# Patient Record
Sex: FEMALE | Race: WHITE | NOT HISPANIC OR LATINO | URBAN - METROPOLITAN AREA
[De-identification: names, ages, dates, MRNs, and addresses within clinical notes are randomized per-mention and may not be internally consistent; named-entity substitution may affect disease eponyms.]

---

## 2019-08-27 ENCOUNTER — OUTPATIENT (OUTPATIENT)
Dept: OUTPATIENT SERVICES | Facility: HOSPITAL | Age: 64
LOS: 1 days | End: 2019-08-27

## 2019-09-09 PROBLEM — Z00.00 ENCOUNTER FOR PREVENTIVE HEALTH EXAMINATION: Status: ACTIVE | Noted: 2019-09-09

## 2019-09-12 ENCOUNTER — APPOINTMENT (OUTPATIENT)
Dept: ORTHOPEDIC SURGERY | Facility: CLINIC | Age: 64
End: 2019-09-12
Payer: MEDICARE

## 2019-09-12 VITALS
WEIGHT: 116 LBS | HEART RATE: 50 BPM | SYSTOLIC BLOOD PRESSURE: 108 MMHG | DIASTOLIC BLOOD PRESSURE: 61 MMHG | BODY MASS INDEX: 21.35 KG/M2 | HEIGHT: 62 IN

## 2019-09-12 DIAGNOSIS — Z80.9 FAMILY HISTORY OF MALIGNANT NEOPLASM, UNSPECIFIED: ICD-10-CM

## 2019-09-12 DIAGNOSIS — Z78.9 OTHER SPECIFIED HEALTH STATUS: ICD-10-CM

## 2019-09-12 PROCEDURE — 73130 X-RAY EXAM OF HAND: CPT | Mod: 26,LT

## 2019-09-12 PROCEDURE — 73110 X-RAY EXAM OF WRIST: CPT | Mod: 26,LT

## 2019-09-12 PROCEDURE — 20550 NJX 1 TENDON SHEATH/LIGAMENT: CPT | Mod: LT

## 2019-09-12 PROCEDURE — 99203 OFFICE O/P NEW LOW 30 MIN: CPT | Mod: 25

## 2019-09-12 RX ORDER — ESCITALOPRAM OXALATE 10 MG/1
10 TABLET, FILM COATED ORAL
Refills: 0 | Status: ACTIVE | COMMUNITY

## 2019-09-12 NOTE — CONSULT LETTER
[Dear  ___] : Dear  [unfilled], [Consult Letter:] : I had the pleasure of evaluating your patient, [unfilled]. [Please see my note below.] : Please see my note below. [Consult Closing:] : Thank you very much for allowing me to participate in the care of this patient.  If you have any questions, please do not hesitate to contact me. [Sincerely,] : Sincerely, [FreeTextEntry3] : Zafar Umana M.D.\par Surgery of the Hand & Upper Extremity\par Orthopaedic Surgery\par Chief, Hand Service, [facility]\par Director, Hand Service, [facility]\par  of Orthopedic Surgery, [facility] [employer] at [facility] \par [facility]\par \par Email: [email or web address] Office Phone: [phone]\par

## 2019-09-12 NOTE — PHYSICAL EXAM
[de-identified] : - Constitutional: This is a female in no obvious distress.  \par - Psych: Patient is alert and oriented to person, place and time.  Patient has a normal mood and affect.\par - Cardiovascular: Normal pulses throughout the upper extremities.  No significant varicosities are noted in the upper extremities. \par - Neuro: Strength and sensation are intact throughout the upper extremities.  Patient has normal coordination.\par - Respiratory:  Patient exhibits no evidence of shortness of breath or difficulty breathing.\par - Skin: No rashes, lesions, or other abnormalities are noted in the upper extremities.\par \par ---\par \par Examination of her left wrist demonstrates a ganglion cyst emanating from the dorsal ulnar aspect of the wrist.  It is freely mobile.  It is nontender.  However, she does have swelling and tenderness along the first dorsal compartment.  She has a positive Finkelstein sign.  She is neurovascularly intact distally. [de-identified] : PA, lateral and oblique radiographs of her left wrist and hand demonstrate mild CMC joint arthritis of the thumb.

## 2019-09-12 NOTE — DISCUSSION/SUMMARY
[FreeTextEntry1] : She has findings consistent with left wrist pain secondary to de Quervain's tendinitis.  She has a ganglion cyst along the dorsum of the wrist which is nontender.\par \par I had a discussion regarding today's visit, the diagnosis, and treatment recommendations / options.\par \par With regard to the de Quervain's tendinitis, I recommended a cortisone injection.  She agreed to proceed.\par \par With regard to the ganglion on cyst, we discussed either observation or aspiration.  She states that it does not bother her and she does not mind how it looks.  She deferred aspiration.  She will follow-up if it increases in size or she develops pain in the future.\par \par The patient has agreed to this plan of management and has expressed full understanding.  All questions were fully answered to the patient's satisfaction.\par \par Over 50% of the time spent with the patient was on counseling the patient on the above diagnosis, treatment plan and prognosis.

## 2019-09-12 NOTE — PROCEDURE
[FreeTextEntry1] : -  After a discussion of risks and benefits, the patient agreed to proceed with a cortisone injection.  \par -  Side: Left first dorsal compartment.\par -  Medications injected: 1 cc of 1% Lidocaine and 1 cc of Betamethasone, 6mg/cc, using sterile technique.\par -  Patient tolerated the procedure well, without complications.\par -  Immediate improvement of the symptoms, secondary to the anesthetic effects of the injection, was noted.\par -  Instructions:  The patient was told that the symptoms should hopefully begin to improve within the next several days. The use of ice, anti-inflammatory agents or Tylenol, and  activity modification was discussed. \par -  Follow-up: She is going away for the next 8 months, and will follow-up when she returns if she is having a problem.  She will seek treatment when she is away if indicated.

## 2019-09-12 NOTE — HISTORY OF PRESENT ILLNESS
[Right] : right hand dominant [FreeTextEntry1] : She comes in today for evaluation of left left wrist pain as well as a ganglion cyst.  She has had symptoms for the past 3 months.  She denies an injury.  She does not have pain in the region of the cyst but does have complaints of pain in the region of the first dorsal compartment.\par \par -She was referred by Dr. Clayton.

## 2019-12-18 PROBLEM — M65.4 DE QUERVAIN'S TENOSYNOVITIS, LEFT: Status: ACTIVE | Noted: 2019-09-12

## 2019-12-18 PROBLEM — M67.432 GANGLION CYST OF WRIST, LEFT: Status: ACTIVE | Noted: 2019-09-12

## 2019-12-23 ENCOUNTER — APPOINTMENT (OUTPATIENT)
Dept: ORTHOPEDIC SURGERY | Facility: CLINIC | Age: 64
End: 2019-12-23
Payer: MEDICARE

## 2019-12-23 DIAGNOSIS — M67.432 GANGLION, LEFT WRIST: ICD-10-CM

## 2019-12-23 DIAGNOSIS — M65.4 RADIAL STYLOID TENOSYNOVITIS [DE QUERVAIN]: ICD-10-CM

## 2019-12-23 PROCEDURE — 99213 OFFICE O/P EST LOW 20 MIN: CPT | Mod: 25

## 2019-12-23 PROCEDURE — 20550 NJX 1 TENDON SHEATH/LIGAMENT: CPT | Mod: LT

## 2019-12-23 NOTE — HISTORY OF PRESENT ILLNESS
[Right] : right hand dominant [FreeTextEntry1] : 3-1/2 months status post left wrist de Quervain's cortisone injection #1.  She also has a ganglion cyst which was not aspirated.\par \par She returns today reporting that the injection only provided relief for 3 weeks and her symptoms have worsened. She rates her pain a 7 out of 10 at this time.\par \par She works as an .

## 2019-12-23 NOTE — END OF VISIT
[FreeTextEntry3] : All medical record entries made by the Scribe were at my, Dr. Umana direction and personally dictated by me on 12/23/2019. I have reviewed the chart and agree that the record accurately reflects my personal performances of the history, physical exam, assessment and plan. I have also personally directed, reviewed, and agreed with the chart.\par

## 2019-12-23 NOTE — DISCUSSION/SUMMARY
[FreeTextEntry1] : I had a discussion regarding today's visit, the diagnosis and treatment recommendations / options.  At this time, we discussed either trying a second cortisone injection or surgical intervention. Patient agreed to proceed with a repeat cortisone injection.  She is leaving for [place] in 1 week, so I would not recommend scheduling surgery as this would require her to delay her trip.  She told me that she would like to try one more injection, knowing that if this does not provide her with long-term relief, then I would recommend surgical release.\par \par The patient has agreed to the above plan of management and has expressed full understanding.  All questions were fully answered to the patient's satisfaction.\par \par I spent at least 25 minutes of face-to-face time with the patient.  Over 50% of this time was spent on counseling the patient on the above diagnosis, treatment plan and prognosis.

## 2019-12-23 NOTE — PROCEDURE
[FreeTextEntry1] : -  After a discussion of risks and benefits, the patient agreed to proceed with a second cortisone injection.  \par -  Side: Left first dorsal compartment.\par -  Medications injected: 1 cc of 1% Lidocaine and 1 cc of Betamethasone, 6mg/cc, using sterile technique.\par -  Patient tolerated the procedure well, without complications.\par -  Immediate improvement of the symptoms, secondary to the anesthetic effects of the injection, was noted.\par -  Instructions:  The patient was told that the symptoms should hopefully begin to improve within the next several days. The use of ice, anti-inflammatory agents or Tylenol, and  activity modification was discussed. \par -  Follow-up: She is going away for the next 3 months, and will follow-up when she returns if she is having a problem.  She will seek treatment when she is away if indicated.

## 2019-12-23 NOTE — PHYSICAL EXAM
[de-identified] : - Constitutional: This is a female in no obvious distress.  \par - Psych: Patient is alert and oriented to person, place and time.  Patient has a normal mood and affect.\par - Cardiovascular: Normal pulses throughout the upper extremities.  No significant varicosities are noted in the upper extremities. \par - Neuro: Strength and sensation are intact throughout the upper extremities.  Patient has normal coordination.\par - Respiratory:  Patient exhibits no evidence of shortness of breath or difficulty breathing.\par - Skin: No rashes, lesions, or other abnormalities are noted in the upper extremities.\par \par ---\par \par Examination of her left wrist demonstrates a ganglion cyst emanating from the dorsal ulnar aspect of the wrist.  It is freely mobile.  It is smaller than it previously was.  It is nontender.  However, she does have swelling and tenderness along the first dorsal compartment.  She has a positive Finkelstein sign.  She is neurovascularly intact distally. [de-identified] : Previous PA, lateral and oblique radiographs of her left wrist and hand demonstrated mild CMC joint arthritis of the thumb.

## 2019-12-23 NOTE — ADDENDUM
[FreeTextEntry1] : I, Brian Frost, acted solely as a scribe for Dr. Umana on this date 12/23/2019.\par

## 2021-07-27 ENCOUNTER — OUTPATIENT (OUTPATIENT)
Dept: OUTPATIENT SERVICES | Facility: HOSPITAL | Age: 66
LOS: 1 days | End: 2021-07-27

## 2021-08-10 ENCOUNTER — APPOINTMENT (OUTPATIENT)
Dept: MAMMOGRAPHY | Facility: CLINIC | Age: 66
End: 2021-08-10
Payer: MEDICARE

## 2021-08-10 PROCEDURE — 77063 BREAST TOMOSYNTHESIS BI: CPT

## 2021-08-10 PROCEDURE — 77067 SCR MAMMO BI INCL CAD: CPT

## 2021-09-08 ENCOUNTER — APPOINTMENT (OUTPATIENT)
Dept: RADIOLOGY | Facility: CLINIC | Age: 66
End: 2021-09-08
Payer: MEDICARE

## 2021-09-08 PROCEDURE — 77080 DXA BONE DENSITY AXIAL: CPT

## 2022-09-13 ENCOUNTER — APPOINTMENT (OUTPATIENT)
Dept: MAMMOGRAPHY | Facility: CLINIC | Age: 67
End: 2022-09-13

## 2022-09-13 PROCEDURE — 77067 SCR MAMMO BI INCL CAD: CPT

## 2022-09-13 PROCEDURE — 77063 BREAST TOMOSYNTHESIS BI: CPT

## 2023-09-14 ENCOUNTER — OFFICE (OUTPATIENT)
Dept: URBAN - METROPOLITAN AREA CLINIC 38 | Facility: CLINIC | Age: 68
Setting detail: OPHTHALMOLOGY
End: 2023-09-14
Payer: MEDICARE

## 2023-09-14 DIAGNOSIS — H26.492: ICD-10-CM

## 2023-09-14 DIAGNOSIS — H16.223: ICD-10-CM

## 2023-09-14 DIAGNOSIS — H43.812: ICD-10-CM

## 2023-09-14 DIAGNOSIS — Z96.1: ICD-10-CM

## 2023-09-14 PROCEDURE — 92014 COMPRE OPH EXAM EST PT 1/>: CPT | Performed by: OPHTHALMOLOGY

## 2023-09-14 ASSESSMENT — KERATOMETRY
OS_K2POWER_DIOPTERS: 39.00
OD_AXISANGLE_DEGREES: 108
OD_K2POWER_DIOPTERS: 40.50
OS_AXISANGLE_DEGREES: 065
OD_K1POWER_DIOPTERS: 40.00
METHOD_AUTO_MANUAL: AUTO
OS_K1POWER_DIOPTERS: 38.50

## 2023-09-14 ASSESSMENT — SPHEQUIV_DERIVED
OD_SPHEQUIV: -1
OS_SPHEQUIV: -1.75
OS_SPHEQUIV: -0.875
OD_SPHEQUIV: -0.5
OD_SPHEQUIV: -0.5
OS_SPHEQUIV: -0.875

## 2023-09-14 ASSESSMENT — REFRACTION_MANIFEST
OS_AXIS: 035
OS_ADD: +2.25
OS_SPHERE: -0.75
OS_VA2: 20/20(J1+)
OD_ADD: +2.25
OD_VA1: 20/20
OD_AXIS: 090
OS_ADD: +2.25
OD_ADD: +2.25
OD_AXIS: 090
OD_VA2: 20/20(J1+)
OS_VA2: 20/20(J1+)
OS_VA1: 20/25-2
OS_VA1: 20/25-2
OS_AXIS: 035
OS_SPHERE: -0.75
OD_SPHERE: -0.25
OS_CYLINDER: -0.25
OD_VA1: 20/20
OD_CYLINDER: -0.50
OD_VA2: 20/20(J1+)
OS_CYLINDER: -0.25
OU_VA: 20/20
OD_CYLINDER: -0.50
OU_VA: 20/20
OD_SPHERE: -0.25

## 2023-09-14 ASSESSMENT — CONFRONTATIONAL VISUAL FIELD TEST (CVF)
OD_FINDINGS: FULL
OS_FINDINGS: FULL

## 2023-09-14 ASSESSMENT — AXIALLENGTH_DERIVED
OS_AL: 25.8798
OD_AL: 25.0673
OS_AL: 26.2966
OS_AL: 25.8798
OD_AL: 25.2892
OD_AL: 25.0673

## 2023-09-14 ASSESSMENT — REFRACTION_CURRENTRX
OS_OVR_VA: 20/
OD_CYLINDER: -0.25
OS_VPRISM_DIRECTION: BF
OD_SPHERE: PLANO
OS_ADD: +2.50
OD_ADD: +2.50
OD_OVR_VA: 20/
OD_VPRISM_DIRECTION: BF
OS_AXIS: 037
OS_CYLINDER: -0.25
OS_SPHERE: -0.50
OD_AXIS: 087

## 2023-09-14 ASSESSMENT — REFRACTION_AUTOREFRACTION
OS_AXIS: 032
OD_AXIS: 076
OD_SPHERE: -0.75
OS_CYLINDER: -0.50
OS_SPHERE: -1.50
OD_CYLINDER: -0.50

## 2023-09-14 ASSESSMENT — VISUAL ACUITY
OD_BCVA: 20/50+
OS_BCVA: 20/25-

## 2023-09-14 ASSESSMENT — TEAR BREAK UP TIME (TBUT)
OS_TBUT: 8 SECS
OD_TBUT: 8 SECS

## 2023-09-14 ASSESSMENT — TONOMETRY
OS_IOP_MMHG: 11
OD_IOP_MMHG: 12

## 2023-09-16 ENCOUNTER — APPOINTMENT (OUTPATIENT)
Dept: MAMMOGRAPHY | Facility: CLINIC | Age: 68
End: 2023-09-16
Payer: MEDICARE

## 2023-09-16 PROCEDURE — 77063 BREAST TOMOSYNTHESIS BI: CPT

## 2023-09-16 PROCEDURE — 77067 SCR MAMMO BI INCL CAD: CPT

## 2024-03-05 ENCOUNTER — APPOINTMENT (OUTPATIENT)
Dept: MRI IMAGING | Facility: CLINIC | Age: 69
End: 2024-03-05
Payer: MEDICARE

## 2024-03-05 PROCEDURE — 72141 MRI NECK SPINE W/O DYE: CPT | Mod: MH

## 2024-07-11 ENCOUNTER — OFFICE (OUTPATIENT)
Dept: URBAN - METROPOLITAN AREA CLINIC 38 | Facility: CLINIC | Age: 69
Setting detail: OPHTHALMOLOGY
End: 2024-07-11
Payer: MEDICARE

## 2024-07-11 DIAGNOSIS — H26.492: ICD-10-CM

## 2024-07-11 DIAGNOSIS — H16.223: ICD-10-CM

## 2024-07-11 PROCEDURE — 99213 OFFICE O/P EST LOW 20 MIN: CPT | Performed by: OPHTHALMOLOGY

## 2024-07-11 ASSESSMENT — CONFRONTATIONAL VISUAL FIELD TEST (CVF)
OS_FINDINGS: FULL
OD_FINDINGS: FULL

## 2024-09-04 ENCOUNTER — APPOINTMENT (OUTPATIENT)
Dept: MAMMOGRAPHY | Facility: CLINIC | Age: 69
End: 2024-09-04
Payer: MEDICARE

## 2024-09-04 PROCEDURE — 77063 BREAST TOMOSYNTHESIS BI: CPT

## 2024-09-04 PROCEDURE — 77067 SCR MAMMO BI INCL CAD: CPT

## 2024-09-19 ENCOUNTER — APPOINTMENT (OUTPATIENT)
Dept: OBGYN | Facility: CLINIC | Age: 69
End: 2024-09-19

## 2024-09-25 ENCOUNTER — APPOINTMENT (OUTPATIENT)
Dept: OBGYN | Facility: CLINIC | Age: 69
End: 2024-09-25
Payer: MEDICARE

## 2024-09-25 VITALS
DIASTOLIC BLOOD PRESSURE: 60 MMHG | BODY MASS INDEX: 22.26 KG/M2 | WEIGHT: 121 LBS | HEIGHT: 62 IN | SYSTOLIC BLOOD PRESSURE: 116 MMHG

## 2024-09-25 DIAGNOSIS — Z01.419 ENCOUNTER FOR GYNECOLOGICAL EXAMINATION (GENERAL) (ROUTINE) W/OUT ABNORMAL FINDINGS: ICD-10-CM

## 2024-09-25 DIAGNOSIS — N64.4 MASTODYNIA: ICD-10-CM

## 2024-09-25 LAB
CARD LOT #: NORMAL
CARD LOT EXP DATE: NORMAL
DATE COLLECTED: NORMAL
DATE COLLECTED: NORMAL
DEVELOPER LOT #: NORMAL
DEVELOPER LOT EXP DATE: NORMAL
HEMOCCULT 2: NEGATIVE
HEMOCCULT SP1 STL QL: NEGATIVE
QUALITY CONTROL: YES
QUALITY CONTROL: YES

## 2024-09-25 PROCEDURE — 82270 OCCULT BLOOD FECES: CPT

## 2024-09-25 PROCEDURE — G0101: CPT

## 2024-09-25 RX ORDER — CHROMIUM 200 MCG
TABLET ORAL
Refills: 0 | Status: ACTIVE | COMMUNITY

## 2024-09-25 RX ORDER — MULTIVITAMIN
TABLET ORAL
Refills: 0 | Status: ACTIVE | COMMUNITY

## 2024-09-25 NOTE — PLAN
[FreeTextEntry1] : Breast sono Decrease caffeine If sono nl and continued breast tenderness, may consult breast surgeon

## 2024-09-25 NOTE — HISTORY OF PRESENT ILLNESS
[Patient declined colonoscopy] : Patient declined colonoscopy [postmenopausal] : postmenopausal [N] : Patient is not sexually active [Y] : Positive pregnancy history [Previously active] : previously active [Men] : men [Vaginal] : vaginal [FreeTextEntry1] : 68 yr old P0 here for annual exam c/o breast tenderness by nipples, both sides. Recent mammo done was normal but did note dense breasts. Reviewed caffeine intake [Mammogramdate] : 09/24 [TextBox_19] : JORDAN DENSE TISSUE [PapSmeardate] : 2019 [BoneDensityDate] : 2022 [TextBox_37] : PCP [PGHxTotal] : 2 [PGHxAbortions] : 2

## 2024-09-26 ENCOUNTER — RESULT REVIEW (OUTPATIENT)
Age: 69
End: 2024-09-26

## 2024-09-26 ENCOUNTER — APPOINTMENT (OUTPATIENT)
Dept: ULTRASOUND IMAGING | Facility: CLINIC | Age: 69
End: 2024-09-26
Payer: MEDICARE

## 2024-09-26 PROCEDURE — 76641 ULTRASOUND BREAST COMPLETE: CPT | Mod: 50,3G

## 2025-07-17 ENCOUNTER — OFFICE (OUTPATIENT)
Dept: URBAN - METROPOLITAN AREA CLINIC 38 | Facility: CLINIC | Age: 70
Setting detail: OPHTHALMOLOGY
End: 2025-07-17
Payer: MEDICARE

## 2025-07-17 DIAGNOSIS — Z96.1: ICD-10-CM

## 2025-07-17 DIAGNOSIS — H43.812: ICD-10-CM

## 2025-07-17 DIAGNOSIS — H26.492: ICD-10-CM

## 2025-07-17 DIAGNOSIS — H52.4: ICD-10-CM

## 2025-07-17 DIAGNOSIS — H16.223: ICD-10-CM

## 2025-07-17 PROCEDURE — 92014 COMPRE OPH EXAM EST PT 1/>: CPT | Performed by: OPHTHALMOLOGY

## 2025-07-17 PROCEDURE — 92015 DETERMINE REFRACTIVE STATE: CPT | Performed by: OPHTHALMOLOGY

## 2025-07-17 ASSESSMENT — REFRACTION_MANIFEST
OD_ADD: +2.25
OS_AXIS: 180
OD_VA2: 20/20(J1+)
OS_CYLINDER: -0.25
OD_VA1: 20/20
OD_ADD: +2.25
OD_AXIS: 090
OS_SPHERE: -1.25
OS_ADD: +2.25
OD_SPHERE: -0.75
OS_AXIS: 035
OS_ADD: +2.25
OD_SPHERE: -0.25
OD_CYLINDER: -0.50
OS_VA1: 20/25-2
OU_VA: 20/20
OU_VA: 20/20
OS_CYLINDER: -0.75
OS_VA2: 20/20(J1+)
OS_VA1: 20/25-2
OD_VA1: 20/20
OD_VA2: 20/20(J1+)
OS_VA2: 20/20(J1+)
OD_AXIS: 075
OD_CYLINDER: -0.25
OS_SPHERE: -0.75

## 2025-07-17 ASSESSMENT — KERATOMETRY
METHOD_AUTO_MANUAL: AUTO
OD_AXISANGLE_DEGREES: 090
OD_K2POWER_DIOPTERS: 40.25
OS_AXISANGLE_DEGREES: 088
OD_K1POWER_DIOPTERS: 40.25
OS_K1POWER_DIOPTERS: 38.75
OS_K2POWER_DIOPTERS: 40.00

## 2025-07-17 ASSESSMENT — SUPERFICIAL PUNCTATE KERATITIS (SPK)
OD_SPK: T
OS_SPK: T

## 2025-07-17 ASSESSMENT — CONFRONTATIONAL VISUAL FIELD TEST (CVF)
OS_FINDINGS: FULL
OD_FINDINGS: FULL

## 2025-07-17 ASSESSMENT — REFRACTION_CURRENTRX
OD_ADD: +2.50
OS_OVR_VA: 20/
OS_SPHERE: -0.50
OS_CYLINDER: -0.25
OS_VPRISM_DIRECTION: BF
OD_AXIS: 087
OD_CYLINDER: -0.25
OD_OVR_VA: 20/
OD_SPHERE: PLANO
OD_VPRISM_DIRECTION: BF
OS_ADD: +2.50
OS_AXIS: 037

## 2025-07-17 ASSESSMENT — REFRACTION_AUTOREFRACTION
OS_SPHERE: -1.50
OD_SPHERE: -0.50
OD_CYLINDER: -0.25
OD_AXIS: 075
OS_AXIS: 180
OS_CYLINDER: -1.00

## 2025-07-17 ASSESSMENT — TEAR BREAK UP TIME (TBUT)
OD_TBUT: 8 SECS
OS_TBUT: 8 SECS

## 2025-07-17 ASSESSMENT — VISUAL ACUITY
OS_BCVA: 20/30-
OD_BCVA: 20/40-1

## 2025-07-17 ASSESSMENT — TONOMETRY
OS_IOP_MMHG: 16
OD_IOP_MMHG: 15

## 2025-09-08 ENCOUNTER — NON-APPOINTMENT (OUTPATIENT)
Age: 70
End: 2025-09-08